# Patient Record
(demographics unavailable — no encounter records)

---

## 2024-12-07 NOTE — ASSESSMENT
[FreeTextEntry1] : 72 year old male patient known to have a history of COPD, GERD, PUD bleeding >10 years ago s/p cauterization, HTN, DM, PAD s/p bilateral leg stents, chronic lumbar/neck pain on Aspirin 325mg (3-4 tabs) every 8 hours daily for years, and recent admission for melena.   Melena and anemia 2/2 Pyloric Ulcer and Ulcerated Duodenal Strictures x2 s/p CRE Balloon Dilation on 10/10 s/p sigmoid diverticulitis: Hepatic Steatosis Asymptomatic Cholelithiasis * CT Abdomen and Pelvis w/ IV Cont (10.08.24 @ 13:46) Mild circumferential wall thickening involving the sigmoid colon with mild inflammatory changes raising a question of mild diverticulitis. Further evaluation with colonoscopy is recommended when patient is clinically able. * Last EGD > 10 years ago for bleeding ulcer s/p cauterization by Dr Armando * Last Colonoscopy >5 years ago; by Dr Armando * Family History: no GI cancers * S/P EGD 10/10: [place] Class III Pyloric Ulcer and Ulcerated Duodenal Strictures x2 s/p CRE Balloon Dilation on 10/10 Dilated CBD and PD on CT; Concern for Pancreatic Divisum on Current Imaging * RUQ US 08/22 normal liver; CBD 5mml cholelithiasis and sludge * Acute hepatitis panel 10/09 unremarkable * CT Angio Abd Aorta w/run-off w/ IV Cont (04.13.22 @ 22:05) Pneumobilia. Hepatic steatosis. Common bile duct measuring up to 1.1 cm. SPLEEN: Unremarkable. PANCREAS: Prominent pancreatic duct. * CT Abdomen and Pelvis w/ IV Cont. (10.08.24 @ 13:46) Mild intrahepatic biliary ductal dilatation. 1.2 cm CBD. Pancreatic divisum.  RECOMMENDATIONS - Fibro scan  - MR/MRCP pancreatic protocol. - EGD with possible dilation or LAMS placement  - Patient has not been taking aspirin since discharge, ok to resume aspirin if deemed necessary preferably 81 mg, patient advised to discuss with vascular regarding antiplatelets is he can be switched to a medication other than aspirin given ulcers and strictures  and in regard to adequate dosage of antiplatelets  - c/w PPI BID  - Schedule colonoscopy. Prep instructions were discussed with the patient.  - Avoid NSAIDs - follow up visit after workup above.

## 2024-12-07 NOTE — REVIEW OF SYSTEMS
[Fever] : no fever [Chills] : no chills [Scleral Icterus (Yellow Eyes)] : no scleral icterus [Sore Throat] : no sore throat [Chest Pain] : no chest pain [Cough] : no cough [SOB on Exertion] : no shortness of breath during exertion [Constipation] : no constipation [Diarrhea] : no diarrhea [Limb Swelling] : no limb swelling [Itching] : no itching [Jaundice (yellowing of skin)] : no jaundice [Confused] : no confusion [Convulsions] : no convulsions [Limb Weakness] : no limb weakness [Suicidal] : not suicidal

## 2024-12-07 NOTE — HISTORY OF PRESENT ILLNESS
[FreeTextEntry1] : 72 year old male patient known to have a history of COPD, GERD, PUD bleeding >10 years ago s/p cauterization, HTN, DM, PAD s/p bilateral leg stents, chronic lumbar/neck pain on Aspirin 325mg (3-4 tabs) every 8 hours daily for years, and recent admission for melena.   Patient was seen in the hospital by GI service and underwent two EGDs reveling  A large inlet patch was seen at 20 cm from the incisors in the mid esophagus. Irregularity in the Z-line and gastroesophageal junction. The remainder of the esophageal mucosa was otherwise unremarkable. . Retained solid food pieces and chunks were seen in the stomach. A 7mm linear non-bleeding clean-based [place] Class III Ulcer was seen along the pylorus. . Retained solid food pieces and chunks were seen in the duodenal bulb. Despite aggressive irrigation and suctioning, the solid food pieces remained in the duodenal bulb and visibility was limited. . An ulcerated stricture with minimal oozing of blood was seen in the duodenal bulb. The lumen of the stricture was 5mm and the GIF scope could not traverse the stricture despite maneuvering and irrigation. Using a CRE balloon dilator, progressive dilation of the stricture was performed from 6-7-8 then 8-9-10 after a wire was successfully traversed through the stricture. After dilation, a small tear with minimal oozing but no perforation/defect was seen along the mucosa of the stricture suggesting successful dilation. The GIF scope was successfully traversed through the stricture post dilation, and a second ulcerated stricture (with no oozing) was seen in the second part of the duodenum. The lumen of the second stricture was around 7mm. Since we ran out of scope, we could not attempt traversing the second stricture. Using a CRE balloon dilator, progressive dilation of the stricture was performed from 6-7-8 then 8-9-10 then 10-11-12 after a wire was successfully traversed through the stricture. After dilation, a small tear with minimal oozing but no perforation/defect was seen along the mucosa of the stricture suggesting successful dilation. We could not traverse the GIF scope post dilation as we ran out of scope. Imaging on the same admission reveled dilated CBD, and divisum.  CT revealed diverticulitis and thickened sigmoid colon.  Patient reports postprandial diarrhea

## 2024-12-07 NOTE — PHYSICAL EXAM
[Alert] : alert [Sclera] : the sclera and conjunctiva were normal [Hearing Threshold Finger Rub Not [place]] : hearing was normal [Normal Appearance] : the appearance of the neck was normal [No Respiratory Distress] : no respiratory distress [Heart Rate And Rhythm] : heart rate was normal and rhythm regular [Bowel Sounds] : normal bowel sounds [No CVA Tenderness] : no CVA  tenderness [Abnormal Walk] : normal gait [Normal Color / Pigmentation] : normal skin color and pigmentation [Oriented To Time, Place, And Person] : oriented to person, place, and time [No Acute Distress] : no acute distress [No Acc Muscle Use] : no accessory muscle use [Respiration, Rhythm And Depth] : normal respiratory rhythm and effort [Auscultation Breath Sounds / Voice Sounds] : lungs were clear to auscultation bilaterally [Normal S1, S2] : normal S1 and S2 [Abdomen Tenderness] : non-tender [No Masses] : no abdominal mass palpated [Abdomen Soft] : soft

## 2024-12-07 NOTE — PHYSICAL EXAM
[Alert] : alert [Sclera] : the sclera and conjunctiva were normal [Hearing Threshold Finger Rub Not [place]] : hearing was normal [Normal Appearance] : the appearance of the neck was normal [No Respiratory Distress] : no respiratory distress [Heart Rate And Rhythm] : heart rate was normal and rhythm regular [Bowel Sounds] : normal bowel sounds [No CVA Tenderness] : no CVA  tenderness [Abnormal Walk] : normal gait [Normal Color / Pigmentation] : normal skin color and pigmentation [Oriented To Time, Place, And Person] : oriented to person, place, and time [No Acute Distress] : no acute distress [No Acc Muscle Use] : no accessory muscle use [Respiration, Rhythm And Depth] : normal respiratory rhythm and effort [Auscultation Breath Sounds / Voice Sounds] : lungs were clear to auscultation bilaterally [Normal S1, S2] : normal S1 and S2 [Abdomen Tenderness] : non-tender [No Masses] : no abdominal mass palpated [Abdomen Soft] : soft

## 2024-12-07 NOTE — HISTORY OF PRESENT ILLNESS
[FreeTextEntry1] : 72 year old male patient known to have a history of COPD, GERD, PUD bleeding >10 years ago s/p cauterization, HTN, DM, PAD s/p bilateral leg stents, chronic lumbar/neck pain on Aspirin 325mg (3-4 tabs) every 8 hours daily for years, and recent admission for melena.   Patient was seen in the hospital by GI service and underwent two EGDs reveling  A large inlet patch was seen at 20 cm from the incisors in the mid esophagus. Irregularity in the Z-line and gastroesophageal junction. The remainder of the esophageal mucosa was otherwise unremarkable. . Retained solid food pieces and chunks were seen in the stomach. A 7mm linear non-bleeding clean-based [place] Class III Ulcer was seen along the pylorus. . Retained solid food pieces and chunks were seen in the duodenal bulb. Despite aggressive irrigation and suctioning, the solid food pieces remained in the duodenal bulb and visibility was limited. . An ulcerated stricture with minimal oozing of blood was seen in the duodenal bulb. The lumen of the stricture was 5mm and the GIF scope could not traverse the stricture despite maneuvering and irrigation. Using a CRE balloon dilator, progressive dilation of the stricture was performed from 6-7-8 then 8-9-10 after a wire was successfully traversed through the stricture. After dilation, a small tear with minimal oozing but no perforation/defect was seen along the mucosa of the stricture suggesting successful dilation. The GIF scope was successfully traversed through the stricture post dilation, and a second ulcerated stricture (with no oozing) was seen in the second part of the duodenum. The lumen of the second stricture was around 7mm. Since we ran out of scope, we could not attempt traversing the second stricture. Using a CRE balloon dilator, progressive dilation of the stricture was performed from 6-7-8 then 8-9-10 then 10-11-12 after a wire was successfully traversed through the stricture. After dilation, a small tear with minimal oozing but no perforation/defect was seen along the mucosa of the stricture suggesting successful dilation. We could not traverse the GIF scope post dilation as we ran out of scope. Imaging on the same admission reveled dilated CBD, and divisum.  CT revealed diverticulitis and thickened sigmoid colon.  Patient reports postprandial diarrhea